# Patient Record
Sex: MALE | Employment: PART TIME | URBAN - METROPOLITAN AREA
[De-identification: names, ages, dates, MRNs, and addresses within clinical notes are randomized per-mention and may not be internally consistent; named-entity substitution may affect disease eponyms.]

---

## 2018-05-24 ENCOUNTER — OFFICE VISIT (OUTPATIENT)
Dept: INTERNAL MEDICINE CLINIC | Age: 28
End: 2018-05-24

## 2018-05-24 VITALS
HEART RATE: 85 BPM | DIASTOLIC BLOOD PRESSURE: 70 MMHG | SYSTOLIC BLOOD PRESSURE: 100 MMHG | RESPIRATION RATE: 17 BRPM | WEIGHT: 162.2 LBS | OXYGEN SATURATION: 98 % | BODY MASS INDEX: 24.58 KG/M2 | TEMPERATURE: 97.9 F | HEIGHT: 68 IN

## 2018-05-24 DIAGNOSIS — Z13.220 LIPID SCREENING: ICD-10-CM

## 2018-05-24 DIAGNOSIS — R19.5 LOOSE STOOLS: ICD-10-CM

## 2018-05-24 DIAGNOSIS — Z83.3 FAMILY HISTORY OF DIABETES MELLITUS IN FATHER: ICD-10-CM

## 2018-05-24 DIAGNOSIS — Z76.89 ENCOUNTER TO ESTABLISH CARE: Primary | ICD-10-CM

## 2018-05-24 NOTE — PROGRESS NOTES
New Patient Evaluation    Kemar Covington is a 29 y.o. male. They are here to establish care with the group and me as a primary care provider. he has seen a doctor in the past. He does not remember the name. The last visit was several years ago    He has a history of an ano-rectal fistula. This was treated by a physician previously with antibiotic and monitoring the area in 2011/2012. He has not had any surgery. At this time, he feels  generally well with this. He reports having some episodic loose stools. He denies pain and blood in the stool. He has had a Tdap and Hep A and Hep B (due for hep B booster). Getting these vaccinations with a vaccine clinic. Otherwise, he has no complaints. Patient Active Problem List    Diagnosis Date Noted    Loose stools 05/24/2018       No Known Allergies  No past medical history on file. History reviewed. No pertinent surgical history. Family History   Problem Relation Age of Onset    Diabetes Father     Cancer Maternal Grandfather      blood cancer     Cancer Paternal Grandmother      blood cancer      Social History   Substance Use Topics    Smoking status: Never Smoker    Smokeless tobacco: Never Used    Alcohol use Yes      Comment: rarely         Health Maintenance   Topic Date Due    DTaP/Tdap/Td series (1 - Tdap) 04/03/2011    Influenza Age 5 to Adult  08/01/2018       Review of Systems   Constitutional: Negative. Respiratory: Negative. Gastrointestinal: Negative. Visit Vitals    /70 (BP 1 Location: Right arm, BP Patient Position: Sitting)    Pulse 85    Temp 97.9 °F (36.6 °C) (Oral)    Resp 17    Ht 5' 8.39\" (1.737 m)    Wt 162 lb 3.2 oz (73.6 kg)    SpO2 98%    BMI 24.38 kg/m2       Physical Exam   Constitutional: He is oriented to person, place, and time. No distress. Cardiovascular: Normal rate and regular rhythm. No murmur heard.   Pulmonary/Chest: Effort normal and breath sounds normal. Neurological: He is alert and oriented to person, place, and time. ASSESSMENT/PLAN    Diagnoses and all orders for this visit:    1. Encounter to establish care    2. Loose stools  -     CBC WITH AUTOMATED DIFF    3. Family history of diabetes mellitus in father  -     TSH 3RD GENERATION  -     HEMOGLOBIN A1C WITH EAG    4. Lipid screening  -     METABOLIC PANEL, COMPREHENSIVE  -     LIPID PANEL        Follow-up Disposition:  Return in about 3 months (around 8/24/2018) for Full Physical - 30 minutes appointment.    -Discussed with the patient to continue the current plan of care. We will obtain baseline labwork and determine if any adjustments need to be done. We will also await the records of the previous PCP to ascertain further details of the patient's history. The patient agrees with and understands the plan of care. All questions have been answered.

## 2018-05-24 NOTE — MR AVS SNAPSHOT
[address] [address] 
[phone] Patient: Erick Coronado MRN: [id number] QNR:5/7/3523 Visit Information Date & Time Provider Department Dept. Phone Encounter #  
 5/24/2018  9:30 AM Lorrie Broussard MD [address] [address] Internal Medicine [phone] [id number] Follow-up Instructions Return in about 3 months (around 8/24/2018) for Full Physical - 30 minutes appointment. Upcoming Health Maintenance Date Due DTaP/Tdap/Td series (1 - Tdap) 4/3/2011 Influenza Age 5 to Adult 8/1/2018 Allergies as of 5/24/2018  Review Complete On: 5/24/2018 By: Lorrie Broussard MD  
 No Known Allergies Current Immunizations  Never Reviewed No immunizations on file. Not reviewed this visit You Were Diagnosed With   
  
 Codes Comments Encounter to establish care    -  Primary ICD-10-CM: Z76.89 
ICD-9-CM: V65.8 Loose stools     ICD-10-CM: R19.5 ICD-9-CM: 787.7 Family history of diabetes mellitus in father     ICD-10-CM: Z80.1 ICD-9-CM: V18.0 Lipid screening     ICD-10-CM: V33.164 ICD-9-CM: V77.91 Vitals BP Pulse Temp Resp Height(growth percentile) Weight(growth percentile) 100/70 (BP 1 Location: Right arm, BP Patient Position: Sitting) 85 97.9 °F (36.6 °C) (Oral) 17 5' 8.39\" (1.737 m) 162 lb 3.2 oz (73.6 kg) SpO2 BMI Smoking Status 98% 24.38 kg/m2 Never Smoker BMI and BSA Data Body Mass Index Body Surface Area  
 24.38 kg/m 2 1.88 m 2 Preferred Pharmacy Pharmacy Name Phone [id number] [address] [phone] Your Updated Medication List  
  
Notice  As of 5/24/2018 10:54 AM  
 You have not been prescribed any medications. We Performed the Following CBC WITH AUTOMATED DIFF [60941 CPT(R)] HEMOGLOBIN A1C WITH EAG [68897 CPT(R)] LIPID PANEL [48528 CPT(R)] METABOLIC PANEL, COMPREHENSIVE [98454 CPT(R)] TSH 3RD GENERATION [36454 CPT(R)] Follow-up Instructions Return in about 3 months (around 8/24/2018) for Full Physical - 30 minutes appointment. Introducing [facility]! May[employer] Meza introduces [employer] patient portal. Now you can access parts of your medical record, email your doctor's office, and request medication refills online. 1. In your internet browser, go to [email or web address] 2. Click on the First Time User? Click Here link in the Sign In box. You will see the New Member Sign Up page. 3. Enter your Tuizzi Access Code exactly as it appears below. You will not need to use this code after youve completed the sign-up process. If you do not sign up before the expiration date, you must request a new code. · Tuizzi Access Code: [email or web address] Expires: 8/22/2018  9:52 AM 
 
4. Enter the last four digits of your Social Security Number (xxxx) and Date of Birth (mm/dd/yyyy) as indicated and click Submit. You will be taken to the next sign-up page. 5. Create a Tuizzi ID. This will be your Tuizzi login ID and cannot be changed, so think of one that is secure and easy to remember. 6. Create a Tuizzi password. You can change your password at any time. 7. Enter your Password Reset Question and Answer. This can be used at a later time if you forget your password. 8. Enter your e-mail address. You will receive e-mail notification when new information is available in [address] 9. Click Sign Up. You can now view and download portions of your medical record. 10. Click the Download Summary menu link to download a portable copy of your medical information. If you have questions, please visit the Frequently Asked Questions section of the Tuizzi website. Remember, Tuizzi is NOT to be used for urgent needs. For medical emergencies, dial 911. Now available from your iPhone and Android! Please provide this summary of care documentation to your next provider. Your primary care clinician is listed as Francies Drop. If you have any questions after today's visit, please call [phone].

## 2018-08-01 LAB
ALBUMIN SERPL-MCNC: 4.3 G/DL (ref 3.5–5.5)
ALBUMIN/GLOB SERPL: 1.8 {RATIO} (ref 1.2–2.2)
ALP SERPL-CCNC: 86 IU/L (ref 39–117)
ALT SERPL-CCNC: 16 IU/L (ref 0–44)
AST SERPL-CCNC: 15 IU/L (ref 0–40)
BASOPHILS # BLD AUTO: 0 X10E3/UL (ref 0–0.2)
BASOPHILS NFR BLD AUTO: 0 %
BILIRUB SERPL-MCNC: 0.5 MG/DL (ref 0–1.2)
BUN SERPL-MCNC: 14 MG/DL (ref 6–20)
BUN/CREAT SERPL: 15 (ref 9–20)
CALCIUM SERPL-MCNC: 9.2 MG/DL (ref 8.7–10.2)
CHLORIDE SERPL-SCNC: 103 MMOL/L (ref 96–106)
CHOLEST SERPL-MCNC: 172 MG/DL (ref 100–199)
CO2 SERPL-SCNC: 22 MMOL/L (ref 20–29)
CREAT SERPL-MCNC: 0.96 MG/DL (ref 0.76–1.27)
EOSINOPHIL # BLD AUTO: 0.4 X10E3/UL (ref 0–0.4)
EOSINOPHIL NFR BLD AUTO: 6 %
ERYTHROCYTE [DISTWIDTH] IN BLOOD BY AUTOMATED COUNT: 14 % (ref 12.3–15.4)
EST. AVERAGE GLUCOSE BLD GHB EST-MCNC: 105 MG/DL
GLOBULIN SER CALC-MCNC: 2.4 G/DL (ref 1.5–4.5)
GLUCOSE SERPL-MCNC: 91 MG/DL (ref 65–99)
HBA1C MFR BLD: 5.3 % (ref 4.8–5.6)
HCT VFR BLD AUTO: 42.1 % (ref 37.5–51)
HDLC SERPL-MCNC: 31 MG/DL
HGB BLD-MCNC: 14.1 G/DL (ref 13–17.7)
IMM GRANULOCYTES # BLD: 0 X10E3/UL (ref 0–0.1)
IMM GRANULOCYTES NFR BLD: 0 %
LDLC SERPL CALC-MCNC: 116 MG/DL (ref 0–99)
LYMPHOCYTES # BLD AUTO: 2.4 X10E3/UL (ref 0.7–3.1)
LYMPHOCYTES NFR BLD AUTO: 42 %
MCH RBC QN AUTO: 27.3 PG (ref 26.6–33)
MCHC RBC AUTO-ENTMCNC: 33.5 G/DL (ref 31.5–35.7)
MCV RBC AUTO: 82 FL (ref 79–97)
MONOCYTES # BLD AUTO: 0.4 X10E3/UL (ref 0.1–0.9)
MONOCYTES NFR BLD AUTO: 7 %
NEUTROPHILS # BLD AUTO: 2.6 X10E3/UL (ref 1.4–7)
NEUTROPHILS NFR BLD AUTO: 45 %
PLATELET # BLD AUTO: 230 X10E3/UL (ref 150–379)
POTASSIUM SERPL-SCNC: 4.2 MMOL/L (ref 3.5–5.2)
PROT SERPL-MCNC: 6.7 G/DL (ref 6–8.5)
RBC # BLD AUTO: 5.16 X10E6/UL (ref 4.14–5.8)
SODIUM SERPL-SCNC: 141 MMOL/L (ref 134–144)
TRIGL SERPL-MCNC: 123 MG/DL (ref 0–149)
TSH SERPL DL<=0.005 MIU/L-ACNC: 3.77 UIU/ML (ref 0.45–4.5)
VLDLC SERPL CALC-MCNC: 25 MG/DL (ref 5–40)
WBC # BLD AUTO: 5.7 X10E3/UL (ref 3.4–10.8)

## 2018-08-23 ENCOUNTER — OFFICE VISIT (OUTPATIENT)
Dept: INTERNAL MEDICINE CLINIC | Age: 28
End: 2018-08-23

## 2018-08-23 VITALS
DIASTOLIC BLOOD PRESSURE: 88 MMHG | WEIGHT: 159.6 LBS | RESPIRATION RATE: 16 BRPM | SYSTOLIC BLOOD PRESSURE: 120 MMHG | OXYGEN SATURATION: 98 % | TEMPERATURE: 98 F | HEART RATE: 73 BPM | HEIGHT: 69 IN | BODY MASS INDEX: 23.64 KG/M2

## 2018-08-23 DIAGNOSIS — E78.2 MIXED HYPERLIPIDEMIA: ICD-10-CM

## 2018-08-23 DIAGNOSIS — Z00.00 WELL ADULT EXAM: Primary | ICD-10-CM

## 2018-08-23 NOTE — MR AVS SNAPSHOT
[address] [address] 
[phone] Patient: Refugio Loyd MRN: [id number] QLQ:7/7/8074 Visit Information Date & Time Provider Department Dept. Phone Encounter #  
 8/23/2018  8:30 AM Omar Majano MD [address] [address] Internal Medicine [phone] [id number] Follow-up Instructions Return in about 1 year (around 8/23/2019) for Full Physical - 30 minutes appointment. Upcoming Health Maintenance Date Due DTaP/Tdap/Td series (1 - Tdap) 4/3/2011 Influenza Age 5 to Adult 8/1/2018 Allergies as of 8/23/2018  Review Complete On: 8/23/2018 By: Arlis Snellen No Known Allergies Current Immunizations  Never Reviewed Name Date Influenza Vaccine 8/9/2018 Tdap 5/29/2018 Not reviewed this visit You Were Diagnosed With   
  
 Codes Comments Well adult exam    -  Primary ICD-10-CM: Z00.00 ICD-9-CM: V70.0 Mixed hyperlipidemia     ICD-10-CM: E78.2 ICD-9-CM: 272.2 Vitals BP Pulse Temp Resp Height(growth percentile) Weight(growth percentile) 120/88 (BP 1 Location: Right arm, BP Patient Position: Sitting) 73 98 °F (36.7 °C) (Oral) 16 5' 8.62\" (1.743 m) 159 lb 9.6 oz (72.4 kg) SpO2 BMI Smoking Status 98% 23.83 kg/m2 Never Smoker BMI and BSA Data Body Mass Index Body Surface Area  
 23.83 kg/m 2 1.87 m 2 Preferred Pharmacy [id number] [address] [phone] Your Updated Medication List  
  
Notice  As of 8/23/2018  9:10 AM  
 You have not been prescribed any medications. Follow-up Instructions Return in about 1 year (around 8/23/2019) for Full Physical - 30 minutes appointment. Introducing [facility]!    
 [facility] introduces [employer] patient portal. Now you can access parts of your medical record, email your doctor's office, and request medication refills online. 1. In your internet browser, go to [email or web address] 2. Click on the First Time User? Click Here link in the Sign In box. You will see the New Member Sign Up page. 3. Enter your Lessonwriter Access Code exactly as it appears below. You will not need to use this code after youve completed the sign-up process. If you do not sign up before the expiration date, you must request a new code. · Lessonwriter Access Code: [email or web address] Expires: 11/21/2018  8:37 AM 
 
4. Enter the last four digits of your Social Security Number (xxxx) and Date of Birth (mm/dd/yyyy) as indicated and click Submit. You will be taken to the next sign-up page. 5. Create a Lessonwriter ID. This will be your Lessonwriter login ID and cannot be changed, so think of one that is secure and easy to remember. 6. Create a Lessonwriter password. You can change your password at any time. 7. Enter your Password Reset Question and Answer. This can be used at a later time if you forget your password. 8. Enter your e-mail address. You will receive e-mail notification when new information is available in [address] 9. Click Sign Up. You can now view and download portions of your medical record. 10. Click the Download Summary menu link to download a portable copy of your medical information. If you have questions, please visit the Frequently Asked Questions section of the Less[employer] website. Remember, [employer]writer is NOT to be used for urgent needs. For medical emergencies, dial 911. Now available from your iPhone and Android! Please provide this summary of care documentation to your next provider. Your primary care clinician is listed as Nico Smith. If you have any questions after today's visit, please call [phone].

## 2018-08-23 NOTE — PROGRESS NOTES
Comprehensive Physical Examination    Leila Matthews is a 29 y.o. male. he presents for a comprehensive physical examination. Vaccines done previously, we will obtain records. He feels well, no complaints. Went to [place] for hiking in June. Felt well, no complaints. Labs obtained, noted mild elevation in LDL. He has been trying to monitor his diet but has not exercised as much as he would like. He has had some loose stools (a chronic issue) but notes that this is currently under control. Patient Active Problem List    Diagnosis Date Noted    Loose stools 05/24/2018       No Known Allergies  No past medical history on file. History reviewed. No pertinent surgical history. Family History   Problem Relation Age of Onset    Diabetes Father     Cancer Maternal Grandfather      blood cancer     Cancer Paternal Grandmother      blood cancer      Social History   Substance Use Topics    Smoking status: Never Smoker    Smokeless tobacco: Never Used    Alcohol use Yes      Comment: rarely         Health Maintenance   Topic Date Due    DTaP/Tdap/Td series (1 - Tdap) 04/03/2011    Influenza Age 5 to Adult  08/01/2018         Review of Systems   Constitutional: Negative. Respiratory: Negative. Cardiovascular: Negative. Gastrointestinal: Negative. Visit Vitals    /88 (BP 1 Location: Right arm, BP Patient Position: Sitting)    Pulse 73    Temp 98 °F (36.7 °C) (Oral)    Resp 16    Ht 5' 8.62\" (1.743 m)    Wt 159 lb 9.6 oz (72.4 kg)    SpO2 98%    BMI 23.83 kg/m2       Physical Exam   Constitutional: He is well-developed, well-nourished, and in no distress. No distress. HENT:   Mouth/Throat: Oropharynx is clear and moist.   Neck: Normal range of motion. Cardiovascular: Normal rate and regular rhythm. No murmur heard. Pulmonary/Chest: Effort normal and breath sounds normal. He has no wheezes. Abdominal: Soft.  Bowel sounds are normal.   Musculoskeletal: He exhibits no edema. Lymphadenopathy:     He has no cervical adenopathy. ASSESSMENT/PLAN  Diagnoses and all orders for this visit:    1. Well adult exam    2. Mixed hyperlipidemia - Monitor diet, we will recheck this at his next evaluation        Follow-up Disposition:  Return in about 1 year (around 8/23/2019) for Full Physical - 30 minutes appointment.

## 2019-05-02 ENCOUNTER — OFFICE VISIT (OUTPATIENT)
Dept: INTERNAL MEDICINE CLINIC | Age: 29
End: 2019-05-02

## 2019-05-02 VITALS
RESPIRATION RATE: 17 BRPM | WEIGHT: 166 LBS | DIASTOLIC BLOOD PRESSURE: 80 MMHG | HEIGHT: 69 IN | TEMPERATURE: 97.9 F | HEART RATE: 72 BPM | SYSTOLIC BLOOD PRESSURE: 120 MMHG | OXYGEN SATURATION: 96 % | BODY MASS INDEX: 24.59 KG/M2

## 2019-05-02 DIAGNOSIS — R05.8 POST-VIRAL COUGH SYNDROME: Primary | ICD-10-CM

## 2019-05-02 RX ORDER — METHYLPREDNISOLONE 4 MG/1
4 TABLET ORAL
Qty: 1 DOSE PACK | Refills: 0 | Status: SHIPPED | OUTPATIENT
Start: 2019-05-02 | End: 2019-08-26 | Stop reason: ALTCHOICE

## 2019-06-03 NOTE — PROGRESS NOTES
Acute Care Note    Analisa Hope is 34 y.o. male. he presents for evaluation of Cough and Breathing Problem      COUGH  Ernie Ruiz is here to talk about a cough. This is a new problem problem. Symptoms began 2 weeks ago, unchanged since that time. The cough is non-productive, without wheezing, dyspnea or hemoptysis and is aggravated by nothing. Associated symptoms include:none. He denies: SOB, fever, chills. He has tried OTC cough medications, and has been not very effective. There is a PMH significant for: a recent viral URI. Prior to Admission medications    Medication Sig Start Date End Date Taking? Authorizing Provider   methylPREDNISolone (MEDROL DOSEPACK) 4 mg tablet Take 1 Tab by mouth Specific Days and Specific Times. 5/2/19  Yes Sue Connors MD         Patient Active Problem List   Diagnosis Code    Loose stools R19.5         Review of Systems   Constitutional: Negative. Respiratory: Positive for cough. Cardiovascular: Negative. Visit Vitals  /80 (BP 1 Location: Right arm, BP Patient Position: Sitting)   Pulse 72   Temp 97.9 °F (36.6 °C) (Oral)   Resp 17   Ht 5' 8.62\" (1.743 m)   Wt 166 lb (75.3 kg)   SpO2 96%   BMI 24.79 kg/m²       Physical Exam   Constitutional: He is oriented to person, place, and time. No distress. Cardiovascular: Normal rate and regular rhythm. Pulmonary/Chest: Effort normal and breath sounds normal.   Neurological: He is alert and oriented to person, place, and time. ASSESSMENT/PLAN  Diagnoses and all orders for this visit:    1. Post-viral cough syndrome  -     methylPREDNISolone (MEDROL DOSEPACK) 4 mg tablet; Take 1 Tab by mouth Specific Days and Specific Times. Advised the patient to call back or return to office if symptoms worsen/change/persist.   Discussed expected course/resolution/complications of diagnosis in detail with patient.      Medication risks/benefits/costs/interactions/alternatives discussed with patient. The patient was given an after visit summary which includes diagnoses, current medications, & vitals. They expressed understanding with the diagnosis and plan.

## 2019-08-26 ENCOUNTER — OFFICE VISIT (OUTPATIENT)
Dept: INTERNAL MEDICINE CLINIC | Age: 29
End: 2019-08-26

## 2019-08-26 VITALS
OXYGEN SATURATION: 97 % | WEIGHT: 170.2 LBS | RESPIRATION RATE: 18 BRPM | TEMPERATURE: 98.2 F | BODY MASS INDEX: 25.21 KG/M2 | HEART RATE: 71 BPM | SYSTOLIC BLOOD PRESSURE: 110 MMHG | DIASTOLIC BLOOD PRESSURE: 82 MMHG | HEIGHT: 69 IN

## 2019-08-26 DIAGNOSIS — D17.1 LIPOMA OF TORSO: ICD-10-CM

## 2019-08-26 DIAGNOSIS — Z00.00 WELL ADULT EXAM: Primary | ICD-10-CM

## 2019-08-26 DIAGNOSIS — L80 VITILIGO: ICD-10-CM

## 2019-08-26 NOTE — PROGRESS NOTES
Comprehensive Physical Examination    Sybil Horner is a 34 y.o. male. he presents for a comprehensive physical examination. He notes feeling well. He has noted vitiligo-appearing lesions on his left hand and also the left foot and buttock. He has noticed this coming on in the past two years. These are becoming more visible and larger. He has a lipoma of the left trunk. Non-tender, no erythema at the site. Patient Active Problem List    Diagnosis Date Noted    Loose stools 05/24/2018       No Known Allergies  History reviewed. No pertinent past medical history. History reviewed. No pertinent surgical history. Family History   Problem Relation Age of Onset    Diabetes Father     Cancer Maternal Grandfather         blood cancer     Cancer Paternal Grandmother         blood cancer      Social History     Tobacco Use    Smoking status: Never Smoker    Smokeless tobacco: Never Used   Substance Use Topics    Alcohol use: Yes     Comment: rarely         Health Maintenance   Topic Date Due    Influenza Age 5 to Adult  08/01/2019    DTaP/Tdap/Td series (2 - Td) 05/29/2028    Pneumococcal 0-64 years  Aged Out         Review of Systems   Constitutional: Negative. Respiratory: Negative. Cardiovascular: Negative. Gastrointestinal: Negative. Visit Vitals  /82 (BP 1 Location: Left arm, BP Patient Position: Sitting)   Pulse 71   Temp 98.2 °F (36.8 °C) (Oral)   Resp 18   Ht 5' 8.62\" (1.743 m)   Wt 170 lb 3.2 oz (77.2 kg)   SpO2 97%   BMI 25.41 kg/m²       Physical Exam   Constitutional: He is oriented to person, place, and time. No distress. Cardiovascular: Normal rate and regular rhythm. Pulmonary/Chest: Effort normal and breath sounds normal. He has no wheezes. Neurological: He is alert and oriented to person, place, and time.    Skin:   Pale patches at the area covering the left MCP joint of the ring finger         ASSESSMENT/PLAN  Diagnoses and all orders for this visit: 1. Well adult exam  -     CBC WITH AUTOMATED DIFF  -     METABOLIC PANEL, COMPREHENSIVE  -     LIPID PANEL    2. Vitiligo  -     REFERRAL TO DERMATOLOGY    3. Lipoma of torso      Follow-up and Dispositions    · Return in about 1 year (around 8/26/2020) for Full Physical - 30 minutes appointment.

## 2019-08-27 LAB
ALBUMIN SERPL-MCNC: 4.5 G/DL (ref 3.5–5.5)
ALBUMIN/GLOB SERPL: 1.7 {RATIO} (ref 1.2–2.2)
ALP SERPL-CCNC: 71 IU/L (ref 39–117)
ALT SERPL-CCNC: 32 IU/L (ref 0–44)
AST SERPL-CCNC: 24 IU/L (ref 0–40)
BASOPHILS # BLD AUTO: 0 X10E3/UL (ref 0–0.2)
BASOPHILS NFR BLD AUTO: 0 %
BILIRUB SERPL-MCNC: 0.5 MG/DL (ref 0–1.2)
BUN SERPL-MCNC: 13 MG/DL (ref 6–20)
BUN/CREAT SERPL: 13 (ref 9–20)
CALCIUM SERPL-MCNC: 9.5 MG/DL (ref 8.7–10.2)
CHLORIDE SERPL-SCNC: 103 MMOL/L (ref 96–106)
CHOLEST SERPL-MCNC: 211 MG/DL (ref 100–199)
CO2 SERPL-SCNC: 24 MMOL/L (ref 20–29)
CREAT SERPL-MCNC: 1.01 MG/DL (ref 0.76–1.27)
EOSINOPHIL # BLD AUTO: 0.3 X10E3/UL (ref 0–0.4)
EOSINOPHIL NFR BLD AUTO: 5 %
ERYTHROCYTE [DISTWIDTH] IN BLOOD BY AUTOMATED COUNT: 13.6 % (ref 12.3–15.4)
GLOBULIN SER CALC-MCNC: 2.6 G/DL (ref 1.5–4.5)
GLUCOSE SERPL-MCNC: 88 MG/DL (ref 65–99)
HCT VFR BLD AUTO: 44.6 % (ref 37.5–51)
HDLC SERPL-MCNC: 36 MG/DL
HGB BLD-MCNC: 15.1 G/DL (ref 13–17.7)
IMM GRANULOCYTES # BLD AUTO: 0 X10E3/UL (ref 0–0.1)
IMM GRANULOCYTES NFR BLD AUTO: 0 %
LDLC SERPL CALC-MCNC: 140 MG/DL (ref 0–99)
LYMPHOCYTES # BLD AUTO: 2.4 X10E3/UL (ref 0.7–3.1)
LYMPHOCYTES NFR BLD AUTO: 41 %
MCH RBC QN AUTO: 27.9 PG (ref 26.6–33)
MCHC RBC AUTO-ENTMCNC: 33.9 G/DL (ref 31.5–35.7)
MCV RBC AUTO: 82 FL (ref 79–97)
MONOCYTES # BLD AUTO: 0.4 X10E3/UL (ref 0.1–0.9)
MONOCYTES NFR BLD AUTO: 6 %
NEUTROPHILS # BLD AUTO: 2.7 X10E3/UL (ref 1.4–7)
NEUTROPHILS NFR BLD AUTO: 48 %
PLATELET # BLD AUTO: 232 X10E3/UL (ref 150–450)
POTASSIUM SERPL-SCNC: 4.2 MMOL/L (ref 3.5–5.2)
PROT SERPL-MCNC: 7.1 G/DL (ref 6–8.5)
RBC # BLD AUTO: 5.41 X10E6/UL (ref 4.14–5.8)
SODIUM SERPL-SCNC: 142 MMOL/L (ref 134–144)
TRIGL SERPL-MCNC: 176 MG/DL (ref 0–149)
VLDLC SERPL CALC-MCNC: 35 MG/DL (ref 5–40)
WBC # BLD AUTO: 5.8 X10E3/UL (ref 3.4–10.8)

## 2020-02-21 ENCOUNTER — OFFICE VISIT (OUTPATIENT)
Dept: INTERNAL MEDICINE CLINIC | Age: 30
End: 2020-02-21

## 2020-02-21 VITALS
TEMPERATURE: 98.9 F | SYSTOLIC BLOOD PRESSURE: 108 MMHG | DIASTOLIC BLOOD PRESSURE: 80 MMHG | WEIGHT: 171 LBS | RESPIRATION RATE: 18 BRPM | BODY MASS INDEX: 25.33 KG/M2 | HEIGHT: 69 IN | OXYGEN SATURATION: 97 % | HEART RATE: 79 BPM

## 2020-02-21 DIAGNOSIS — J06.9 VIRAL UPPER RESPIRATORY TRACT INFECTION: Primary | ICD-10-CM

## 2020-02-21 DIAGNOSIS — H66.92 LEFT OTITIS MEDIA, UNSPECIFIED OTITIS MEDIA TYPE: ICD-10-CM

## 2020-02-21 DIAGNOSIS — Z20.828 EXPOSURE TO THE FLU: ICD-10-CM

## 2020-02-21 LAB
QUICKVUE INFLUENZA TEST: NEGATIVE
VALID INTERNAL CONTROL?: YES

## 2020-02-21 RX ORDER — AMOXICILLIN 875 MG/1
875 TABLET, FILM COATED ORAL 2 TIMES DAILY
Qty: 20 TAB | Refills: 0 | Status: SHIPPED | OUTPATIENT
Start: 2020-02-21 | End: 2020-03-02

## 2020-02-21 NOTE — PROGRESS NOTES
Subjective:   Naif Rebolledo is a 34 y.o. male who complains of  Cough started a week ago sore throat has resolved. overall improving. His child has flu and was concerned he might have flu. He denies a history of shortness of breath and wheezing. Evaluation to date: none. Treatment to date: OTC products. Patient does not smoke cigarettes. Relevant PMH: No pertinent additional PMH. No Known Allergies     Review of Systems  Pertinent items are noted in HPI. Objective:     Visit Vitals  /80 (BP 1 Location: Right arm)   Pulse 79   Temp 98.9 °F (37.2 °C) (Oral)   Resp 18   Ht 5' 8.5\" (1.74 m)   Wt 171 lb (77.6 kg)   SpO2 97%   BMI 25.62 kg/m²     General:  alert, cooperative, no distress   Eyes: negative   Ears: Left TM erythematous and bulging. normal right TM' and external ear canals AU   Sinuses:    Normal paranasal sinuses without tenderness   Mouth:  normal findings: oropharynx pink & moist without lesions or evidence of thrush   Neck: supple, symmetrical, trachea midline and no adenopathy. Heart: normal rate and regular rhythm. Lungs: clear to auscultation bilaterally   Abdomen: soft, non-tender. Bowel sounds normal. No masses,  no organomegaly        Assessment/Plan:     Encounter Diagnoses   Name Primary?  Viral upper respiratory tract infection Yes    Left otitis media, unspecified otitis media type     Exposure to the flu    plan:   Orders Placed This Encounter    AMB POC RAPID INFLUENZA TEST    amoxicillin (AMOXIL) 875 mg tablet   . Flu test negative. I have discussed the diagnosis with the patient and the intended plan as seen in the above orders. The patient has received an after-visit summary and questions were answered concerning future plans. I have discussed medication side effects and warnings with the patient as well. He has expressed understanding of the diagnosis and plan    Follow-up and Dispositions    · Return if symptoms worsen or fail to improve. Aspects of this note may have been generated using voice recognition software.  Despite editing, there may be some syntax errors

## 2022-03-19 PROBLEM — R19.5 LOOSE STOOLS: Status: ACTIVE | Noted: 2018-05-24

## 2023-11-29 ENCOUNTER — TELEPHONE (OUTPATIENT)
Age: 33
End: 2023-11-29

## 2023-11-29 NOTE — TELEPHONE ENCOUNTER
Melida Lorenzo from [facility], Dr. [place]el [place] office, called. Patient has an appointment there tomorrow and told them that he has seen Dr. Devon Wadsworth, a few years ago, for an abscess and fistula. Melida Lorenzo would like to know if we had any office visits notes concerning this and if we could fax them to her. Melida Lorenzo - [phone] ext. [phone] [facility]

## 2023-11-29 NOTE — TELEPHONE ENCOUNTER
Called and left message on  for Sophie Hermosillo regarding this patient. There are no records pertaining to an abscess or fistula for this patient.

## 2024-07-30 ENCOUNTER — OFFICE VISIT (OUTPATIENT)
Age: 34
End: 2024-07-30

## 2024-07-30 VITALS
WEIGHT: 162 LBS | BODY MASS INDEX: 23.19 KG/M2 | DIASTOLIC BLOOD PRESSURE: 76 MMHG | HEIGHT: 70 IN | SYSTOLIC BLOOD PRESSURE: 116 MMHG | TEMPERATURE: 101.6 F | RESPIRATION RATE: 18 BRPM | HEART RATE: 107 BPM | OXYGEN SATURATION: 96 %

## 2024-07-30 DIAGNOSIS — R52 BODY ACHES: ICD-10-CM

## 2024-07-30 DIAGNOSIS — R05.9 COUGH WITH FEVER: ICD-10-CM

## 2024-07-30 DIAGNOSIS — J18.9 PNEUMONIA OF LEFT LUNG DUE TO INFECTIOUS ORGANISM, UNSPECIFIED PART OF LUNG: Primary | ICD-10-CM

## 2024-07-30 DIAGNOSIS — R50.9 COUGH WITH FEVER: ICD-10-CM

## 2024-07-30 PROBLEM — K60.3 ANAL FISTULA: Status: ACTIVE | Noted: 2023-12-06

## 2024-07-30 PROBLEM — L80 VITILIGO: Status: ACTIVE | Noted: 2024-07-30

## 2024-07-30 LAB
Lab: NORMAL
PERFORMING INSTRUMENT: NORMAL
QC PASS/FAIL: NORMAL
SARS-COV-2, POC: NORMAL

## 2024-07-30 RX ORDER — AZITHROMYCIN 250 MG/1
TABLET, FILM COATED ORAL
Qty: 6 TABLET | Refills: 0 | Status: SHIPPED | OUTPATIENT
Start: 2024-07-30 | End: 2024-08-09

## 2024-07-30 RX ORDER — CEFPODOXIME PROXETIL 200 MG/1
200 TABLET, FILM COATED ORAL 2 TIMES DAILY
Qty: 14 TABLET | Refills: 0 | Status: SHIPPED | OUTPATIENT
Start: 2024-07-30 | End: 2024-08-06

## 2024-07-30 RX ORDER — GUAIFENESIN 600 MG/1
600 TABLET, EXTENDED RELEASE ORAL 2 TIMES DAILY
Qty: 14 TABLET | Refills: 0 | COMMUNITY
Start: 2024-07-30 | End: 2024-08-06

## 2024-07-30 NOTE — PROGRESS NOTES
Bryant Ward (:  1990) is a 34 y.o. male,New patient, here for evaluation of the following chief complaint(s):  Cough (Pt presents w/ cough, chest pain, chills, sweats, body aches; pt says cough started ~ 1 week ago and symptoms worsened ~ 3 days ago)      Assessment & Plan :  Visit Diagnoses and Associated Orders       Pneumonia of left lung due to infectious organism, unspecified part of lung    -  Primary    cefpodoxime (VANTIN) 200 MG tablet [9469]      azithromycin (ZITHROMAX) 250 MG tablet [48607]      guaiFENesin (MUCINEX) 600 MG extended release tablet [15151]           Body aches        POCT COVID-19, Antigen [NNB950 Custom]           Cough with fever        XR CHEST (2 VIEWS) [60289 CPT(R)]   - Future Order                  Rapid COVID-19 negative.  CXR demonstrated patchy left lung infiltrate.  Febrile, mild tachycardia 2/2 fever.  To start cefpodoxime twice daily x 7 days and azithromycin per Dosepak instructions for acute pneumonia of the left lung.  To start Mucinex 600 mg twice daily.  To avoid cough suppressants.  To monitor symptoms and follow-up with PCP or urgent care within 48 to 72 hours if symptoms worsen at any time or do not improve on antibiotic therapy.  May use nasal saline, cool mist humidifier, rest, increased fluid intake for symptomatic relief.  To ED for severe CP, chest tightness, SOB, rapid worsening of symptoms. Patient verbalized understanding, no questions or concerns at this time .  Follow up in PRN days if symptoms persist or if symptoms worsen.       Subjective :  HPI  HPI:   34 y.o. male presents with symptoms of productive cough with yellow green sputum, SOB, chest discomfort with coughing and deep breathing, fatigue, mild rhinitis, fever/chills/sweats x 10 days. Initially symptoms started with sore throat. Symptoms have worsened in the past 3 days. Denies pain. Using tylenol with mild improvement in symptoms. Nothing makes symptoms worse. Denies sick contacts.